# Patient Record
Sex: MALE | Employment: FULL TIME | URBAN - METROPOLITAN AREA
[De-identification: names, ages, dates, MRNs, and addresses within clinical notes are randomized per-mention and may not be internally consistent; named-entity substitution may affect disease eponyms.]

---

## 2023-11-20 ENCOUNTER — TELEMEDICINE (OUTPATIENT)
Dept: UROLOGY | Facility: CLINIC | Age: 38
End: 2023-11-20
Payer: COMMERCIAL

## 2023-11-20 DIAGNOSIS — Z30.09 ENCOUNTER FOR VASECTOMY ASSESSMENT: ICD-10-CM

## 2023-11-20 PROCEDURE — 99203 OFFICE O/P NEW LOW 30 MIN: CPT | Performed by: UROLOGY

## 2023-11-20 ASSESSMENT — ENCOUNTER SYMPTOMS
SHORTNESS OF BREATH: 0
FEVER: 0
NAUSEA: 0
COUGH: 0
ENDOCRINE NEGATIVE: 1
ALLERGIC/IMMUNOLOGIC NEGATIVE: 1
DIFFICULTY URINATING: 0
EYES NEGATIVE: 1
PSYCHIATRIC NEGATIVE: 1
CHILLS: 0

## 2023-11-20 NOTE — PROGRESS NOTES
Subjective   Patient ID: Joe Frost is a 38 y.o. male.    HPI  Patient is here for vasectomy consult.  He has 3 children. No LUT'S sx.       Review of Systems   Constitutional:  Negative for chills and fever.   HENT: Negative.     Eyes: Negative.    Respiratory:  Negative for cough and shortness of breath.    Cardiovascular:  Negative for chest pain and leg swelling.   Gastrointestinal:  Negative for nausea.   Endocrine: Negative.    Genitourinary:  Negative for difficulty urinating.        Negative except for documented in HPI   Allergic/Immunologic: Negative.    Neurological:         Alert & oriented X 3   Hematological:         Denies blood thinners   Psychiatric/Behavioral: Negative.         Objective   Physical Exam  No PE done given the virtual nature of visit.   Assessment/Plan   Pros/cons of vasectomy reviewed. Questions answered.   Observe mild LUTS. F/U vas in office   Diagnoses and all orders for this visit:  Encounter for vasectomy assessment      Pros/cons of vasectomy reviewed. Questions answered.   Valium Rx given.   Observe mild LUTS.   F/U vas in office

## 2024-01-16 PROBLEM — Z30.2 ADMISSION FOR VASECTOMY: Status: ACTIVE | Noted: 2024-01-16

## 2024-01-16 NOTE — PROGRESS NOTES
Patient ID: Joe Frost is a 38 y.o. male.    Procedures    The patient was prepped and draped in the standard surgical fashion. 1% Lidocaine was injected into the scrotum. A small scrotal excision was made and the vas deferens brought through the incision. We then dissected the vas deferens free of its surroundings attachments and three clips were placed on the vas deferens. A section of the vas deferens was then excised. We then assured that adequate hemostasis was obtained. I closed the excision with a single chromic suture. The identical procedure was performed on the opposite side. The patient tolerated the procedure well and there were no complications. The patient was instructed on post-operative care as well as the importance of dropping off a semen analysis. The post-operative instructions were given to the patient in writing as well.     FOLLOW UP PRN.

## 2024-01-22 ENCOUNTER — PROCEDURE VISIT (OUTPATIENT)
Dept: UROLOGY | Facility: CLINIC | Age: 39
End: 2024-01-22
Payer: COMMERCIAL

## 2024-01-22 VITALS
DIASTOLIC BLOOD PRESSURE: 80 MMHG | SYSTOLIC BLOOD PRESSURE: 134 MMHG | HEIGHT: 69 IN | BODY MASS INDEX: 29.47 KG/M2 | WEIGHT: 199 LBS

## 2024-01-22 DIAGNOSIS — Z30.2 ADMISSION FOR VASECTOMY: ICD-10-CM

## 2024-01-22 PROCEDURE — 55250 REMOVAL OF SPERM DUCT(S): CPT | Performed by: UROLOGY
